# Patient Record
(demographics unavailable — no encounter records)

---

## 2024-10-22 NOTE — PHYSICAL EXAM
[Alert] : alert [Normocephalic] : normocephalic [Flat Open Anterior [place]] : flat open anterior fontanelle [Red Reflex] : red reflex bilateral [PERRL] : PERRL [Normally Placed Ears] : normally placed ears [Auricles Well Formed] : auricles well formed [Clear Tympanic membranes] : clear tympanic membranes [Light reflex present] : light reflex present [Bony landmarks visible] : bony landmarks visible [Nares Patent] : nares patent [Palate Intact] : palate intact [Uvula Midline] : uvula midline [Supple, full passive range of motion] : supple, full passive range of motion [Symmetric Chest Rise] : symmetric chest rise [Clear to Auscultation Bilaterally] : clear to auscultation bilaterally [Regular Rate and Rhythm] : regular rate and rhythm [S1, S2 present] : S1, S2 present [+2 Femoral Pulses] : (+) 2 femoral pulses [Soft] : soft [Bowel Sounds] : bowel sounds present [Normal External Genitalia] : normal external genitalia [Normal Vaginal Introitus] : normal vaginal introitus [Patent] : patent [Normally Placed] : normally placed [No Abnormal Lymph Nodes Palpated] : no abnormal lymph nodes palpated [Symmetric Buttocks Creases] : symmetric buttocks creases [Plantar Grasp] : plantar grasp reflex present [Cranial Nerves Grossly Intact] : cranial nerves grossly intact [Acute Distress] : no acute distress [Discharge] : no discharge [Tooth Eruption] : no tooth eruption [Palpable Masses] : no palpable masses [Murmurs] : no murmurs [Tender] : nontender [Distended] : nondistended [Hepatomegaly] : no hepatomegaly [Splenomegaly] : no splenomegaly [Clitoromegaly] : no clitoromegaly [Sheikh-Ortolani] : negative Sheikh-Ortolani [Allis Sign] : negative Allis sign [Spinal Dimple] : no spinal dimple [Tuft of Hair] : no tuft of hair [Rash or Lesions] : no rash/lesions

## 2024-10-22 NOTE — DISCUSSION/SUMMARY
[Normal Growth] : growth [Normal Development] : development [None] : No medical problems [No Elimination Concerns] : elimination [No Feeding Concerns] : feeding [No Skin Concerns] : skin [Normal Sleep Pattern] : sleep [Family Functioning] : family functioning [Nutrition and Feeding] : nutrition and feeding [Infant Development] : infant development [Oral Health] : oral health [Safety] : safety [No Medications] : ~He/She~ is not on any medications [Parent/Guardian] : parent/guardian [] : The components of the vaccine(s) to be administered today are listed in the plan of care. The disease(s) for which the vaccine(s) are intended to prevent and the risks have been discussed with the caretaker.  The risks are also included in the appropriate vaccination information statements which have been provided to the patient's caregiver.  The caregiver has given consent to vaccinate. [FreeTextEntry1] : Anticipatory guidance and parent education given. Recommend breastfeeding, 8-12 feedings per day.  If formula is needed, 2-4 oz every 3-4 hrs.  Introduce single-ingredient foods rich in iron, one at a time. Incorporate up to 4 oz of water daily in a sippy cup.  When teeth erupt wipe daily with washcloth. PVF daily. When in car, patient should be in rear-facing car seat in back seat.  Put baby to sleep on back, in own crib with no loose or soft bedding. Lower crib mattress.  Help baby to maintain sleep and feeding routines. May offer pacifier if needed.  Continue tummy time when awake.  Ensure home is safe since baby is now more mobile. Do not use infant walker. Read aloud to baby. Pentacel, Prevnar, Rotavirus, Flu vaccines given. Beyfortus given. Return in 3 months for PE, 1 month for Flu booster.

## 2024-10-22 NOTE — DEVELOPMENTAL MILESTONES
[Normal Development] : Normal Development [None] : none [Pats or smiles at reflection] : pats or smiles at reflection [Begins to turn when name called] : begins to turn when name called [Babbles] : babbles [Rolls over prone to supine] : rolls over prone to supine [Sits briefly without support] : sits briefly without support [Reaches for object and transfers] : reaches for object and transfers [Rakes small object with 4 fingers] : rakes small object with 4 fingers [[place] small object on surface] : bangs small object on surface [Passed] : passed [FreeTextEntry2] : 6

## 2024-10-22 NOTE — HISTORY OF PRESENT ILLNESS
[Parents] : parents [Well-balanced] : well-balanced [Formula ___ oz/feed] : [unfilled] oz of formula per feed [Formula ___ oz in 24hrs] : [unfilled] oz of formula in 24 hours [Normal] : Normal [___ voids per day] : [unfilled] voids per day [Frequency of stools: ___] : Frequency of stools: [unfilled]  stools [per day] : per day. [In Bassinet/Crib] : sleeps in bassinet/crib [On back] : sleeps on back [Sleeps 12-16 hours per 24 hours (including naps)] : sleeps 12-16 hours per 24 hours (including naps) [Tummy time] : tummy time [No] : No cigarette smoke exposure [Water heater temperature set at <120 degrees F] : Water heater temperature set at <120 degrees F [Rear facing car seat in back seat] : Rear facing car seat in back seat [Carbon Monoxide Detectors] : Carbon monoxide detectors at home [Smoke Detectors] : Smoke detectors at home. [Co-sleeping] : no co-sleeping [Loose bedding, pillow, toys, and/or bumpers in crib] : no loose bedding, pillow, toys, and/or bumpers in crib [de-identified] : Doing well [de-identified] : None [FreeTextEntry1] : 6 month old baby girl here for routine PE. Doing well. No current concerns. Good po/uop/bm. Tolerating solids well. Normal sleep and activity. Growth and development wnl.

## 2025-01-21 NOTE — HISTORY OF PRESENT ILLNESS
Quality 47: Advance Care Plan: Advance Care Planning discussed and documented in the medical record; patient did not wish or was not able to name a surrogate decision maker or provide an advance care plan. Quality 131: Pain Assessment And Follow-Up: Pain assessment using a standardized tool is documented as negative, no follow-up plan required Quality 110: Preventive Care And Screening: Influenza Immunization: Influenza Immunization Ordered or Recommended, but not Administered Quality 111:Pneumonia Vaccination Status For Older Adults: Pneumococcal Vaccination not Administered or Previously Received, Reason not Otherwise Specified Detail Level: Simple Additional Notes: PQRS #47: It is the patients' responsibility to provide copies of the documents to the front staff to have scanned into their charts. \\nPQRS #110 and #111: [facility] advises you to speak with your primary care physician in regards to flu shots and/or pneumonia vaccinations. \\nPQRS #226: [facility] advises you to reach out for support from the Centers for Disease Control and Prevention (CDC) on providing resources for smoking cessation programs or speak with your primary care physician. \\nPQRS #131: [facility] advises you to speak to your primary care provider for further evaluation. Quality 130: Documentation Of Current Medications In The Medical Record: Current Medications Documented [Well-balanced] : well-balanced [Formula ___ oz in 24 hrs] : [unfilled] oz of formula in 24 hours [Fruit] : fruit [Vegetables] : vegetables [Cereal] : cereal [Meat] : meat [Eggs] : eggs [Peanut] : peanut [Dairy] : dairy [Baby food] : baby food [Finger foods] : finger foods [Water] : water [Normal] : Normal [___ voids per day] : [unfilled] voids per day [Frequency of stools: ___] : Frequency of stools: [unfilled]  stools [per day] : per day. [In Crib] : sleeps in crib [Sleeps 12-16 hours per 24 hours (including naps)] : sleeps 12-16 hours per 24 hours (including naps) [Vitamin] : Primary Fluoride Source: Vitamin [No] : No cigarette smoke exposure [Water heater temperature set at <120 degrees F] : Water heater temperature set at <120 degrees F [Rear facing car seat in  back seat] : Rear facing car seat in  back seat [Carbon Monoxide Detectors] : Carbon monoxide detectors [Smoke Detectors] : Smoke detectors [Up to date] : Up to date [Co-sleeping] : no co-sleeping [Wakes up at night] : does not wake up at night [Loose bedding, pillow, toys, and/or bumpers in crib] : no loose bedding, pillow, toys, and/or bumpers in crib [FreeTextEntry7] : Doing well [de-identified] : None [FreeTextEntry1] : 9 month old baby girl here for routine PE. Doing well, no current concerns. Stands when placed, max, pincer grasp, points. Growth and development wnl. Good po/uop/bm. Normal sleep and activity.

## 2025-01-21 NOTE — PHYSICAL EXAM
[Alert] : alert [Normocephalic] : normocephalic [Flat Open Anterior [place]] : flat open anterior fontanelle [Red Reflex] : red reflex bilateral [PERRL] : PERRL [Normally Placed Ears] : normally placed ears [Auricles Well Formed] : auricles well formed [Clear Tympanic membranes] : clear tympanic membranes [Light reflex present] : light reflex present [Bony landmarks visible] : bony landmarks visible [Nares Patent] : nares patent [Palate Intact] : palate intact [Uvula Midline] : uvula midline [Supple, full passive range of motion] : supple, full passive range of motion [Symmetric Chest Rise] : symmetric chest rise [Clear to Auscultation Bilaterally] : clear to auscultation bilaterally [Regular Rate and Rhythm] : regular rate and rhythm [S1, S2 present] : S1, S2 present [+2 Femoral Pulses] : (+) 2 femoral pulses [Soft] : soft [Bowel Sounds] : bowel sounds present [Normal External Genitalia] : normal external genitalia [Normal Vaginal Introitus] : normal vaginal introitus [No Abnormal Lymph Nodes Palpated] : no abnormal lymph nodes palpated [Symmetric abduction and rotation of hips] : symmetric abduction and rotation of hips [Straight] : straight [Cranial Nerves Grossly Intact] : cranial nerves grossly intact [Acute Distress] : no acute distress [Excessive Tearing] : no excessive tearing [Discharge] : no discharge [Palpable Masses] : no palpable masses [Murmurs] : no murmurs [Tender] : nontender [Distended] : nondistended [Hepatomegaly] : no hepatomegaly [Splenomegaly] : no splenomegaly [Clitoromegaly] : no clitoromegaly [Allis Sign] : negative Allis sign [Rash or Lesions] : no rash/lesions

## 2025-01-21 NOTE — DISCUSSION/SUMMARY
[Normal Growth] : growth [Normal Development] : development [None] : No known medical problems [No Elimination Concerns] : elimination [No Feeding Concerns] : feeding [No Skin Concerns] : skin [Normal Sleep Pattern] : sleep [Family Adaptation] : family adaptation [Infant [place]] : infant independence [Feeding Routine] : feeding routine [Safety] : safety [No Medications] : ~He/She~ is not on any medications [Parent/Guardian] : parent/guardian [] : The components of the vaccine(s) to be administered today are listed in the plan of care. The disease(s) for which the vaccine(s) are intended to prevent and the risks have been discussed with the caretaker.  The risks are also included in the appropriate vaccination information statements which have been provided to the patient's caregiver.  The caregiver has given consent to vaccinate. [FreeTextEntry1] : Anticipatory guidance and parent education given. Continue breast milk or formula as desired. Increase table foods, 3 meals with 2-3 snacks per day. Incorporate up to 6 oz of water daily in a sippy cup. Discussed weaning of bottle and pacifier. Wipe teeth daily with washcloth. Fluoride vitamin daily. When in car, patient should be in rear-facing car seat in back seat. Put baby to sleep in own crib with no loose or soft bedding. Lower crib mattress. Help baby to maintain consistent daily routines and sleep schedule. Recognize stranger anxiety. Ensure home is safe since baby is increasingly mobile. Be within arm's reach of baby at all times. Use consistent, positive discipline. Avoid screen time. Read aloud to baby. Hepatitis B vaccine given. Follow up in 3 months for PE.

## 2025-01-21 NOTE — DEVELOPMENTAL MILESTONES
[Normal Development] : Normal Development [None] : none [Uses basic gestures] : uses basic gestures [Says "Juan" or "Mama"] : says "Juan" or "Mama" nonspecifically [Sits well without support] : sits well without support [Transitions between sitting and lying] : transitions between sitting and lying [Balances on hands and knees] : balances on hands and knees [Crawls] : crawls [Picks up small objects with 3 fingers] : picks up small objects with 3 fingers and thumb [Releases objects intentionally] : releases objects intentionally [[place] objects together] : bangs objects together [Yes] : Completed.

## 2025-01-25 NOTE — DISCUSSION/SUMMARY
[FreeTextEntry1] : Anticipatory guidance and parent education given.  ? ring worm F/u in office in 3-4 days if no imporvement, earlier for worsening symptoms  clotrimazole as discussed Follow up as needed for persistent or worsening symptoms, questions and concerns.

## 2025-01-25 NOTE — HISTORY OF PRESENT ILLNESS
[de-identified] : rash [FreeTextEntry6] : BIB father for rash. Father reports that since being seen in office on 1/21, patient has developed red circles on her forehead, backs, and arms. Denies new products or foods.  Patient does not seem to realize they are there. No known sick exposures.  No fever. No difficulty breathing, cough, congestion. No v/d. No fatigue. Good po/uop/bm. Normal sleep and activity.

## 2025-01-25 NOTE — PHYSICAL EXAM
[NL] : normotonic [de-identified] : erythematous dry annular lesions with central clearing to: forehead near hair line (3), back (2) and one on L upper arm

## 2025-01-27 NOTE — DISCUSSION/SUMMARY
[FreeTextEntry1] : Anticipatory guidance and parent education given. Dermatology referral made. CBC, Lead, Ferritin, Lyme titers ordered. Will follow up by phone when results are available. Start Amoxicillin for Lyme treatment although low suspicion for ECM. Supportive care. Follow up as needed for persistent or worsening symptoms.

## 2025-01-27 NOTE — PHYSICAL EXAM
[NL] : normotonic [Erythematous] : erythematous [Central Clearing] : central clearing [Face] : face [Trunk] : trunk [Hands] : hands [Legs] : legs [Raised Borders] : no raised borders [FreeTextEntry7] : no increased work of breathing [de-identified] : multiple, circular lesions with erythematous, flat borders and central clearing

## 2025-01-27 NOTE — HISTORY OF PRESENT ILLNESS
[de-identified] : rash [FreeTextEntry6] : 9 month old girl BIB father with c/o spreading rash over the past 6-7 days. Started as a flat, circular lesion to temple area then progressed to multiple circular lesions of different sizes over the course of a few days. Seen in office 2 days ago and dx with tinea corporis. Father concerned that there are more lesions today. Rash is not itchy, edges of lesions are red but not raised and all have central clearing. No other sx. No recent fever or URI. No new products or contacts. No h/o tick bite. Good po/uop/bm. Normal sleep and activity.

## 2025-01-28 NOTE — ASSESSMENT
[FreeTextEntry1] : Rash - new dx of uncertain prognosis DDX erythema multiforme minor in setting of Hep B vaccine vs. urticaria vs. other - Potential DDX discussed - Advised this rash could be reactive in setting of above, or viral etiology - Start cetirizine 2.5mL BID  - No need for amoxicillin  Warning signs discussed with dad - if develops fevers/sores in mouth, advised pt to go to ED RTC 1 month

## 2025-01-28 NOTE — HISTORY OF PRESENT ILLNESS
[FreeTextEntry1] : rash [de-identified] : Ms. ISIDRA MONK is a 9 month old F (born premature) here for evaluation of below   #Rash x1 week Denies fevers, runny nose, fussiness, no change in appetite or bowel movements. Has raspy cough 2 days ago, but not terrible.  Had Hep B vaccine last week, after which rash progressed.  Eating and drinking well. Lesions come and go    Personal hx of skin cancer: no FHx of skin cancer: no Social Hx: here with dad Darryl Referred by: Dr. KATHI SALINAS,OLIVA WORTHINGTON

## 2025-01-28 NOTE — PHYSICAL EXAM
[Alert] : alert [Well Nourished] : well nourished [Conjunctiva Non-injected] : conjunctiva non-injected [No Visual Lymphadenopathy] : no visual  lymphadenopathy [No Clubbing] : no clubbing [No Edema] : no edema [No Bromhidrosis] : no bromhidrosis [No Chromhidrosis] : no chromhidrosis [Full Body Skin Exam Performed] : performed [FreeTextEntry3] : annular pink non-scaly patches and thin plaques on scalp, face > arms, legs, torso.  few edematous pink papules

## 2025-02-11 NOTE — ASSESSMENT
[FreeTextEntry1] : #Rash - new dx of uncertain prognosis DDX erythema multiforme minor in setting of Hep B vaccine vs. urticaria vs. other - Potential DDX discussed - Advised this rash could be reactive in setting of above, or viral etiology. Can take several weeks to resolve - c/w cetirizine 2.5mL BID  - No need for amoxicillin Warning signs discussed with dad - if develops fevers/sores in mouth, advised pt to go to ED RTC 1 month   #Atopic dermatitis - new dx of uncertain prognosis Mild on cheeks - Diagnosis, chronic nature, disease course, treatment options and goals of therapy discussed - Dry skin care - Start HC 2.5% ointment BID PRN for upto 1 week. SED

## 2025-02-11 NOTE — HISTORY OF PRESENT ILLNESS
[FreeTextEntry1] : rash [de-identified] : Ms. ISIDRA MONK is a 9 month old F (born premature) here for evaluation of below   #Rash x1 week Denies fevers, runny nose, fussiness, no change in appetite or bowel movements. Has raspy cough 2 days ago, but not terrible.  Had Hep B vaccine last week, after which rash progressed.  Eating and drinking well. Lesions come and go - 2/11/25: since LV 1/28 rash progressing to other parts of body, still overall doing well otherwise. Also with redness on cheeks, rough to touch    Personal hx of skin cancer: no FHx of skin cancer: no Social Hx: here with dad Darryl Referred by: Dr. KATHI SALINAS,OLIVA WORTHINGTON

## 2025-02-11 NOTE — PHYSICAL EXAM
[Alert] : alert [Well Nourished] : well nourished [Conjunctiva Non-injected] : conjunctiva non-injected [No Visual Lymphadenopathy] : no visual  lymphadenopathy [No Clubbing] : no clubbing [No Edema] : no edema [No Bromhidrosis] : no bromhidrosis [No Chromhidrosis] : no chromhidrosis [Full Body Skin Exam Performed] : performed [FreeTextEntry3] : eczematous erythematous patches on bl cheeks annular pink non-scaly patches and thin plaques on scalp, face > arms, legs, torso.

## 2025-04-21 NOTE — DISCUSSION/SUMMARY
[Normal Growth] : growth [Normal Development] : development [None] : No known medical problems [No Elimination Concerns] : elimination [No Feeding Concerns] : feeding [No Skin Concerns] : skin [Normal Sleep Pattern] : sleep [Family Support] : family support [Establishing Routines] : establishing routines [Feeding and Appetite Changes] : feeding and appetite changes [Establishing A Dental Home] : establishing a dental home [Safety] : safety [No Medications] : ~He/She~ is not on any medications [Parent/Guardian] : parent/guardian [] : The components of the vaccine(s) to be administered today are listed in the plan of care. The disease(s) for which the vaccine(s) are intended to prevent and the risks have been discussed with the caretaker.  The risks are also included in the appropriate vaccination information statements which have been provided to the patient's caregiver.  The caregiver has given consent to vaccinate. [FreeTextEntry1] : Anticipatory guidance and parent education given. Transition to whole cow's milk 16-24oz daily.  Continue table foods, 3 meals with 2-3 snacks per day. Importance of well-balanced, healthy diet discussed. Incorporate up to 6 oz of water daily in a sippy cup. Brush teeth twice a day with soft toothbrush. Recommend visit to dentist. Continue Fluoride supplement. When in car, keep child in rear-facing car seats until age 2, or until the maximum height and weight for seat is reached. Put baby to sleep in own crib with no loose or soft bedding. Lower crib mattress. Help baby to maintain consistent daily routines and sleep schedule. Recognize stranger and separation anxiety. Ensure home is safe since baby is increasingly mobile. Be within arm's reach of baby at all times. Use consistent, positive discipline. Avoid screen time. Read aloud to baby. CBC, Lead, ferritin ordered. MMR, Prevnar vaccines given. F/U in 3 months for routine PE.

## 2025-04-21 NOTE — PHYSICAL EXAM
[Alert] : alert [Normocephalic] : normocephalic [Closed Anterior [place]] : closed anterior fontanelle [Red Reflex] : red reflex bilateral [PERRL] : PERRL [Normally Placed Ears] : normally placed ears [Auricles Well Formed] : auricles well formed [Clear Tympanic membranes] : clear tympanic membranes [Light reflex present] : light reflex present [Bony landmarks visible] : bony landmarks visible [Nares Patent] : nares patent [Palate Intact] : palate intact [Uvula Midline] : uvula midline [Tooth Eruption] : tooth eruption [Supple, full passive range of motion] : supple, full passive range of motion [Symmetric Chest Rise] : symmetric chest rise [Clear to Auscultation Bilaterally] : clear to auscultation bilaterally [Regular Rate and Rhythm] : regular rate and rhythm [S1, S2 present] : S1, S2 present [+2 Femoral Pulses] : (+) 2 femoral pulses [Soft] : soft [Bowel Sounds] : normoactive bowel sounds [Normal External Genitalia] : normal external genitalia [Normal Vaginal Introitus] : normal vaginal introitus [No Abnormal Lymph Nodes Palpated] : no abnormal lymph nodes palpated [Symmetric Abduction and Rotation of Hips] : symmetric abduction and rotation of hips [Straight] : straight [Cranial Nerves Grossly Intact] : cranial nerves grossly intact [Discharge] : no discharge [Palpable Masses] : no palpable masses [Murmurs] : no murmurs [Tender] : nontender [Distended] : nondistended [Hepatomegaly] : no hepatomegaly [Splenomegaly] : no splenomegaly [Clitoromegaly] : no clitoromegaly [Allis Sign] : negative Allis sign [Rash or Lesions] : no rash/lesions

## 2025-04-21 NOTE — HISTORY OF PRESENT ILLNESS
[Parents] : parents [Formula ___ oz/feed] : [unfilled] oz of formula per feed [Cow's milk ___ oz/feed] : [unfilled] oz of Cow's milk per feed [Fruit] : fruit [Vegetables] : vegetables [Meat] : meat [Dairy] : dairy [Baby food] : baby food [Finger food] : finger food [Table food] : table food [Normal] : Normal [Sippy cup use] : Sippy cup use [Vitamin] : Primary Fluoride Source: Vitamin [Playtime] : Playtime  [No] : No cigarette smoke exposure [Water heater temperature set at <120 degrees F] : Water heater temperature set at <120 degrees F [Car seat in back seat] : Car seat in back seat [Smoke Detectors] : Smoke detectors [Carbon Monoxide Detectors] : Carbon monoxide detectors [Up to date] : Up to date [At risk for exposure to TB] : Not at risk for exposure to Tuberculosis [FreeTextEntry7] : Doing well [de-identified] : None [de-identified] : 20-24oz per day of formula/milk [FreeTextEntry1] : 12 month old girl here for routine PE. Doing well. No current concerns.  Stands alone, says a few words, waves, indicates wants. Good po/uop/bm. Tolerating whole milk. Normal sleep and activity. Growth and development wnl.

## 2025-07-21 NOTE — DEVELOPMENTAL MILESTONES
[Normal Development] : Normal Development [None] : none [Imitates scribbling] : imitates scribbling [Drinks from cup with little] : drinks from cup with little spilling [Points to ask for something] : points to ask for something or to get help [Uses 3 words other than names] : uses 3 words other than names [Speaks in sounds that seem like] : speaks in sounds that seem like an unknown language [Follows directions that do not] : follows direction that do not include a gesture [Looks when parent says,] : looks when parent says, "Where is...?" [Squats to  objects] : squats to  objects [Crawls up a few steps] : crawls up a few steps [Begins to run] : begins to run [Makes marialuisa with crayon] : makes marialuisa with esteryon [Drops object into and takes object] : drops object into and takes object out of container

## 2025-07-21 NOTE — PHYSICAL EXAM
[Alert] : alert [No Acute Distress] : no acute distress [Normocephalic] : normocephalic [Anterior [place] Closed] : anterior fontanelle closed [Red Reflex Bilateral] : red reflex bilateral [PERRL] : PERRL [Normally Placed Ears] : normally placed ears [Auricles Well Formed] : auricles well formed [Clear Tympanic membranes with present light reflex and bony landmarks] : clear tympanic membranes with present light reflex and bony landmarks [No Discharge] : no discharge [Nares Patent] : nares patent [Palate Intact] : palate intact [Uvula Midline] : uvula midline [Tooth Eruption] : tooth eruption  [Supple, full passive range of motion] : supple, full passive range of motion [No Palpable Masses] : no palpable masses [Symmetric Chest Rise] : symmetric chest rise [Clear to Auscultation Bilaterally] : clear to auscultation bilaterally [Regular Rate and Rhythm] : regular rate and rhythm [S1, S2 present] : S1, S2 present [No Murmurs] : no murmurs [+2 Femoral Pulses] : +2 femoral pulses [Soft] : soft [NonTender] : non tender [Non Distended] : non distended [Normoactive Bowel Sounds] : normoactive bowel sounds [No Hepatomegaly] : no hepatomegaly [No Splenomegaly] : no splenomegaly [Tony 1] : Tony 1 [No Clitoromegaly] : no clitoromegaly [Normal Vaginal Introitus] : normal vaginal introitus [Patent] : patent [Normally Placed] : normally placed [No Abnormal Lymph Nodes Palpated] : no abnormal lymph nodes palpated [No Clavicular Crepitus] : no clavicular crepitus [Negative Sheikh-Ortalani] : negative Sheikh-Ortalani [Symmetric Buttocks Creases] : symmetric buttocks creases [No Spinal Dimple] : no spinal dimple [NoTuft of Hair] : no tuft of hair [Cranial Nerves Grossly Intact] : cranial nerves grossly intact [No Rash or Lesions] : no rash or lesions

## 2025-07-21 NOTE — HISTORY OF PRESENT ILLNESS
[Parents] : parents [Cow's milk (Ounces per day ___)] : consumes [unfilled] oz of cow's milk per day [Fruit] : fruit [Vegetables] : vegetables [Meat] : meat [Cereal] : cereal [Eggs] : eggs [Baby food] : baby food [Finger Foods] : finger foods [Table food] : table food [Normal] : Normal [Sippy cup use] : Sippy cup use [Brushing teeth] : Brushing teeth [Vitamin] : Primary Fluoride Source: Vitamin [Playtime] : Playtime [Temper Tantrums] : Temper tantrums [No] : No cigarette smoke exposure [Water heater temperature set at <120 degrees F] : Water heater temperature set at <120 degrees F [Car seat in back seat] : Car seat in back seat [Carbon Monoxide Detectors] : Carbon monoxide detectors [Smoke Detectors] : Smoke detectors [Up to date] : Up to date [FreeTextEntry7] : Doing well [de-identified] : None [FreeTextEntry1] : 15 month old girl here for routine PE. Doing well. No current concerns.  Starting to run, few words, scribbles, spoon/fork. Good po/uop/bm. Normal sleep and activity. Growth and development wnl.

## 2025-07-21 NOTE — DISCUSSION/SUMMARY
[Normal Growth] : growth [Normal Development] : development [None] : No known medical problems [No Elimination Concerns] : elimination [No Feeding Concerns] : feeding [No Skin Concerns] : skin [Normal Sleep Pattern] : sleep [Communication and Social Development] : communication and social development [Sleep Routines and Issues] : sleep routines and issues [Temper Tantrums and Discipline] : temper tantrums and discipline [Healthy Teeth] : healthy teeth [Safety] : safety [No Medications] : ~He/She~ is not on any medications [Parent/Guardian] : parent/guardian [] : The components of the vaccine(s) to be administered today are listed in the plan of care. The disease(s) for which the vaccine(s) are intended to prevent and the risks have been discussed with the caretaker.  The risks are also included in the appropriate vaccination information statements which have been provided to the patient's caregiver.  The caregiver has given consent to vaccinate. [FreeTextEntry1] : Anticipatory guidance and parent education given. Continue whole cow's milk. Continue table foods, 3 meals with 2-3 snacks per day. Importance of well-balanced, healthy diet discussed. Incorporate water daily in a sippy cup. Brush teeth twice a day with soft toothbrush. Recommend visit to dentist. Continue Fluoride supplement. When in car, keep child in rear-facing car seats until age 2, or until  the maximum height and weight for seat is reached. Put baby to sleep in own crib. Lower crib mattress. Help baby to maintain consistent daily routines and sleep schedule. Recognize stranger and separation anxiety. Ensure home is safe since baby is increasingly mobile. Be within arm's reach of baby at all times. Use consistent, positive discipline. Read aloud to baby. Varicella, Hepatitis A vaccines given. Readvise CBC, Lead Ferritin screening. Return in 3 mo for 18 mo well child check.